# Patient Record
Sex: FEMALE | Race: WHITE | NOT HISPANIC OR LATINO | Employment: OTHER | URBAN - METROPOLITAN AREA
[De-identification: names, ages, dates, MRNs, and addresses within clinical notes are randomized per-mention and may not be internally consistent; named-entity substitution may affect disease eponyms.]

---

## 2017-02-17 ENCOUNTER — AMBULATORY - HEALTHEAST (OUTPATIENT)
Dept: ADMINISTRATIVE | Facility: REHABILITATION | Age: 57
End: 2017-02-17

## 2017-02-17 DIAGNOSIS — I69.320 APHASIA DUE TO RECENT CEREBRAL INFARCTION: ICD-10-CM

## 2017-02-27 ENCOUNTER — OFFICE VISIT - HEALTHEAST (OUTPATIENT)
Dept: SPEECH THERAPY | Facility: REHABILITATION | Age: 57
End: 2017-02-27

## 2017-02-27 DIAGNOSIS — I69.390 APRAXIA, POST-STROKE: ICD-10-CM

## 2017-02-27 DIAGNOSIS — I69.320 APHASIA DUE TO RECENT CEREBRAL INFARCTION: ICD-10-CM

## 2017-02-27 DIAGNOSIS — I69.320 APHASIA, POST-STROKE: ICD-10-CM

## 2017-03-06 ENCOUNTER — OFFICE VISIT - HEALTHEAST (OUTPATIENT)
Dept: SPEECH THERAPY | Facility: REHABILITATION | Age: 57
End: 2017-03-06

## 2017-03-06 DIAGNOSIS — I69.320 APHASIA, POST-STROKE: ICD-10-CM

## 2017-03-06 DIAGNOSIS — I69.390 APRAXIA, POST-STROKE: ICD-10-CM

## 2017-03-13 ENCOUNTER — OFFICE VISIT - HEALTHEAST (OUTPATIENT)
Dept: SPEECH THERAPY | Facility: REHABILITATION | Age: 57
End: 2017-03-13

## 2017-03-13 DIAGNOSIS — I69.320 APHASIA, POST-STROKE: ICD-10-CM

## 2017-03-13 DIAGNOSIS — I69.390 APRAXIA, POST-STROKE: ICD-10-CM

## 2017-03-22 ENCOUNTER — OFFICE VISIT - HEALTHEAST (OUTPATIENT)
Dept: SPEECH THERAPY | Facility: REHABILITATION | Age: 57
End: 2017-03-22

## 2017-03-22 DIAGNOSIS — I69.320 APHASIA, POST-STROKE: ICD-10-CM

## 2017-03-22 DIAGNOSIS — I69.390 APRAXIA, POST-STROKE: ICD-10-CM

## 2020-06-19 ENCOUNTER — RECORDS - HEALTHEAST (OUTPATIENT)
Dept: ADMINISTRATIVE | Facility: OTHER | Age: 60
End: 2020-06-19

## 2020-07-08 ENCOUNTER — HOSPITAL ENCOUNTER (OUTPATIENT)
Dept: MAMMOGRAPHY | Facility: CLINIC | Age: 60
Discharge: HOME | End: 2020-07-08
Attending: FAMILY MEDICINE

## 2020-07-08 DIAGNOSIS — Z12.31 VISIT FOR SCREENING MAMMOGRAM: ICD-10-CM

## 2020-08-07 ENCOUNTER — RECORDS - HEALTHEAST (OUTPATIENT)
Dept: LAB | Facility: CLINIC | Age: 60
End: 2020-08-07

## 2020-08-07 LAB
CHOLEST SERPL-MCNC: 282 MG/DL
FASTING STATUS PATIENT QL REPORTED: ABNORMAL
FASTING STATUS PATIENT QL REPORTED: NORMAL
GLUCOSE BLD-MCNC: 109 MG/DL (ref 70–125)
HDLC SERPL-MCNC: 42 MG/DL
LDLC SERPL CALC-MCNC: 196 MG/DL
TRIGL SERPL-MCNC: 218 MG/DL

## 2020-08-10 LAB — HCV AB SERPL QL IA: NEGATIVE

## 2020-09-21 ENCOUNTER — TELEPHONE (OUTPATIENT)
Dept: NEUROLOGY | Facility: CLINIC | Age: 60
End: 2020-09-21

## 2020-09-21 NOTE — TELEPHONE ENCOUNTER
Bill from billing left msg stating pt. Coverage start date is 5/1 so the PA from 1/10 is not valid. A new PA will need to be obtained for Botox.

## 2020-09-24 NOTE — TELEPHONE ENCOUNTER
I spoke with Bill about this today and he is going to appeal the decision. Patients ID # and group # did not change so we were not aware that plan changed.   Katiuska Almanza CMA on 9/24/2020 at 8:56 AM

## 2021-01-15 ENCOUNTER — HEALTH MAINTENANCE LETTER (OUTPATIENT)
Age: 61
End: 2021-01-15

## 2021-02-05 ENCOUNTER — RECORDS - HEALTHEAST (OUTPATIENT)
Dept: LAB | Facility: CLINIC | Age: 61
End: 2021-02-05

## 2021-02-05 LAB
CHOLEST SERPL-MCNC: 186 MG/DL
FASTING STATUS PATIENT QL REPORTED: NORMAL
HDLC SERPL-MCNC: 51 MG/DL
LDLC SERPL CALC-MCNC: 113 MG/DL
TRIGL SERPL-MCNC: 111 MG/DL

## 2021-05-24 ENCOUNTER — RECORDS - HEALTHEAST (OUTPATIENT)
Dept: ADMINISTRATIVE | Facility: CLINIC | Age: 61
End: 2021-05-24

## 2021-05-29 ENCOUNTER — RECORDS - HEALTHEAST (OUTPATIENT)
Dept: ADMINISTRATIVE | Facility: CLINIC | Age: 61
End: 2021-05-29

## 2021-05-31 ENCOUNTER — RECORDS - HEALTHEAST (OUTPATIENT)
Dept: ADMINISTRATIVE | Facility: CLINIC | Age: 61
End: 2021-05-31

## 2021-06-09 NOTE — PROGRESS NOTES
[facility]   Evaluation and Initial Plan of Care    Patient Name: Yeni Amos  Date of evaluation: 2/27/2017  Referral Diagnosis:Aphasia due to recent infarction  Referring provider: Jesus Soria MD  Visit Diagnosis:     ICD-10-CM    1. Aphasia, post-stroke I69.920    2. Apraxia, post-stroke I69.390          Assessment:      Patient presents with moderate to severe aphasia, along with apraxia of speech.    Long Term Goals  Pt. will : learn and use compensatory strategies to aid in production of both written and verbal expression at 1-3 word level in 8 weeks.  Short Term Goals  Patient will write phrases with  moderate cuing with 60% accuracy in 6 weeks   Patient will imitate a list of 10 personal words with 70% accuracy.  Patient will produce labial consonant plus vowel combinations in imitation with 80% accuracy in 6 weeks.    Patient goal:  To learn how to write and speak better.  Patient's expectations/goals are realistic to a limited extent due to the amount of speech therapy already received in the past.    Rehab potential: Good based on prior level of function, evaluation results and motivation and cooperation.     Plan / Patient Instructions:      Plan of Care:  Authorization / Certification Start Date: 02/27/17  Communication with: Referral Source  Patient Related Instruction: Nature of Condition;Treatment plan and rationale;Basis of treatment;Expected outcome  Times per Week: 1  Number of Weeks: 8  Number of Visits: 8    Plan:   Speech therapy 1 times a week for 8 weeks.    Education:   Patient, Family participated in education regarding evaluation results, treatment plan/rationale and expected functional outcome  Patient, Family demonstrate understanding of the education provided.       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  female who presents to therapy today with complaints of difficulty communicating either verbally or in writing. Date of onset/duration of symptoms is 8/13/2000.      Pertinent history includes : CVA on 8/13/2000 resulting in aphasia and apraxia of speech.  Patient presents as alert and cooperative during the session.  Patient reports no pain     Objective:      Examination    Oral motor function was not impaired.    Motor speech was not impaired.   Speech intelligibility was approximately 100 % at the single word level    Voice was not impaired.     Trach/Vent: N/A    Auditory Comprehension:    Patient follows 1 step directions with 100 % accuracy and 2 step verbal directions with 33% accuracy.  Patient answers  simple yes/no questions with 80%accuracy, and complex questions with 50% accuracy.  Patient can follow  simple conversation, but needs verbal information repeated several times before comprehension can occur with lengthier , more complex information.     Reading Comprehension:  Patient can comprehend  simple single words with 100 % accuracy.  Patient can comprehend  simple sentences with 100 % accuracy.  Patient can orally read  simple single words with 40 % accuracy.    Verbal Expression:  Patient produces automatic speech with 100 % accuracy for counting 1-20, but she can only recite 6 out of the 7 days of the week. She can only recite A,B,/C,D, and F for the alphabet.  Patient performs sentence completion tasks with 20 % accuracy.  Patient completes responsive naming task with 0 % accuracy verbally, but with 100% when writing the response.  Patient completes confrontation naming task with 40 % accuracy uncued, 10% with a phonemic cue, and 50% when writing the responses.  Patient answers simple single word questions with 0 % accuracy, but with 50% when using writing.  Patient  can form sentences in conversation inconsistently when just talking in spontaneous conversation, but she has difficulty on  structured language tasks.    Written Expression:  Patient can write  name and address with 100 % accuracy.  Patient can write single words with 50%  accuracy.   She can write short words but not lengthy words.  Cognition: Not assessed due to focus on evaluation of speech /language skills and also due to  time constraints.    Interventions Today  Interventions MINUTES   Speech - Language 60   Cognition    Dysphagia    Assistive technology    Voice            Total 60          Roz Geiger MS, CCC-SLP  2/27/2017

## 2021-06-09 NOTE — PROGRESS NOTES
[facility]   Speech Therapy Daily Progress     Patient Name: Yeni Amos  Date: 3/6/2017  Visit #: 2  Referral Diagnosis: Aphasia due to recent infarction  Referring provider: Jesus Soria MD  Visit Diagnosis:     ICD-10-CM    1. Aphasia, post-stroke I69.920    2. Apraxia, post-stroke I69.390          Session 2 of 8    Assessment:   Patient is benefitting from skilled speech therapy and is making steady progress toward functional goals.  Patient is appropriate to continue with skilled speech therapy intervention, as indicated by initial plan of care.     Long Term Goals  Pt. will : learn and use compensatory strategies to aid in production of both written and verbal expression at 1-3 word level in 8 weeks.  Short Term Goals  Patient will write phrases with moderate cuing with 60% accuracy in 6 weeks (progressing)  Patient will imitate a list of 10 personal words with 70% accuracy.  Patient will produce labial consonant plus vowel combinations in imitation with 80% accuracy in 6 weeks.(progressing)            Plan / Patient Education:     Continue with initial plan of care.    Subjective:     Patient presents as alert and cooperative during the session.  Pain Ratin        Objective:     Speech production: Patient was able to imitate CV combinations beginning with /m/ with 83% accuracy, beginning with /p/ with 50% accuracy, and beginning with /b/ with 83% accuracy.  She imitated CVC combinations beginning with /m/ with 60% accuracy, beginning with /p/ with 40% accuracy, and beginning with /b/ with 50% accuracy. Patient needs max verbal and visual cues to imitate many of the words.  Patient orally read CV combinations beginning with /m/ with 66% accuracy, beginning with /p/ with 66% accuracy, and beginning with /b/ with 83% accuracy. She orally read CVC combinations beginning with /m/ with 20% accuracy, beginning with /p/ with 14% accuracy, and beginning with /b/ with 16%  "accuracy.  Writing: The patient wrote \"I am statement with 40% accuracy uncued.  Patient wrote action verb to describe action picture with 40% accuracy and an object to go with the verb with 80% accuracy ( i.e. Throw ball, blow balloon, brush teeth)      Interventions Today   Interventions MINUTES   Speech - Language 58   Cognition    Dysphagia    Assistive technology    Voice            Total 58     Roz Geigre MS, CCC-SLP  3/6/2017    "

## 2021-06-09 NOTE — PROGRESS NOTES
"[facility]   Speech Therapy Daily Progress     Patient Name: Yeni Amos  Date: 3/13/2017  Visit #: 3  Referral Diagnosis: Aphasia due to recent infarction  Referring provider: Jesus Soria MD  Visit Diagnosis:     ICD-10-CM    1. Aphasia, post-stroke I69.920    2. Apraxia, post-stroke I69.390          Session 3 of 8    Assessment:   Patient is benefitting from skilled speech therapy and is making steady progress toward functional goals.  Patient is appropriate to continue with skilled speech therapy intervention, as indicated by initial plan of care.     Long Term Goals  Pt. will : learn and use compensatory strategies to aid in production of both written and verbal expression at 1-3 word level in 8 weeks.  Short Term Goals  Patient will write phrases with moderate cuing with 60% accuracy in 6 weeks (progressing)  Patient will imitate a list of 10 personal words with 70% accuracy.(progressing)  Patient will produce labial consonant plus vowel combinations in imitation with 80% accuracy in 6 weeks.(progressing)    Plan / Patient Education:     Continue with initial plan of care.    Subjective:     Patient presents as alert and cooperative during the session.  Pain Ratin        Objective:     Writing: Patient was able to write a 3 word phrase -\"I am -- statements with 90% accuracy.  She wrote 2- 3 word common phrases with 30% accuracy (ie. Come here, I miss you) uncued.  Speech production: She was able to orally read CV lists as follows: /m/ plus vowel with 83% accuracy; /p/ plus vowel with 66% accuracy; and /b/ plus vowel with 83% accuracy. She imitated [facility] word lists as follows: /m/ words with 70% accuracy; /b/ words with 40% accuracy; and /p/ words with 80% accuracy on 1st try.      Interventions Today   Interventions MINUTES   Speech - Language 55   Cognition    Dysphagia    Assistive technology    Voice            Total 55     Roz Geiger MS, CCC-SLP  3/13/2017    "

## 2021-06-10 NOTE — PROGRESS NOTES
[facility] Discharge Summary    Patient Name: Yeni Amos  Date: 4/25/2017  Referral Diagnosis: Aphasia due to recent infarction  Referring provider: Jesus Soria MD  Visit Diagnosis:     ICD-10-CM    1. Aphasia, post-stroke I69.920    2. Apraxia, post-stroke I69.390          Assessment:   Patient has had limited response to speech therapy due to dealing with migraines, time post onset of stroke, and severity of her deficits. She cancelled her last 3 scheduled speech therapy visits, and then called to discontinue speech therapy. All of her speech therapy goals were not met, even though patient was highly motivated and cooperative in therapy sessions.  Long Term Goals  Pt. will : learn and use compensatory strategies to aid in production of both written and verbal expression at 1-3 word level in 8 weeks.(Not met)  Short Term Goals  Patient will write phrases with moderate cuing with 60% accuracy in 6 weeks (MET)  Patient will imitate a list of 10 personal words with 70% accuracy.(Not addressed)  Patient will produce labial consonant plus vowel combinations in imitation with 80% accuracy in 6 weeks.(inconsistently met)           Patient was able to write 2-3 word phrases with 70% accuracy.  Patient could imitate consonant plus vowel combinations with up to 100% accuracy but not consistently. Percentages of accuracy varied from session to session and even within a session.  Patient has learned overtime how to communicate in a limited fashion with her , family, and friends using various means of expression.    Plan / Patient Education:     The patient did not wish to schedule further therapy at this time and cancelled remaining speech therapy visits. Thus, speech therapy will be discontinued.      Roz Geiger MS, CCC-SLP  4/25/2017

## 2021-07-21 ENCOUNTER — RECORDS - HEALTHEAST (OUTPATIENT)
Dept: ADMINISTRATIVE | Facility: CLINIC | Age: 61
End: 2021-07-21

## 2021-08-06 ENCOUNTER — LAB REQUISITION (OUTPATIENT)
Dept: LAB | Facility: CLINIC | Age: 61
End: 2021-08-06

## 2021-08-06 DIAGNOSIS — E78.2 MIXED HYPERLIPIDEMIA: ICD-10-CM

## 2021-08-06 DIAGNOSIS — R31.9 HEMATURIA, UNSPECIFIED: ICD-10-CM

## 2021-08-06 LAB
CHOLEST SERPL-MCNC: 200 MG/DL
FASTING STATUS PATIENT QL REPORTED: ABNORMAL
HDLC SERPL-MCNC: 49 MG/DL
LDLC SERPL CALC-MCNC: 126 MG/DL
TRIGL SERPL-MCNC: 124 MG/DL

## 2021-08-06 PROCEDURE — 36415 COLL VENOUS BLD VENIPUNCTURE: CPT | Performed by: FAMILY MEDICINE

## 2021-08-06 PROCEDURE — 80061 LIPID PANEL: CPT | Performed by: FAMILY MEDICINE

## 2021-08-06 PROCEDURE — 87086 URINE CULTURE/COLONY COUNT: CPT | Performed by: FAMILY MEDICINE

## 2021-08-09 LAB
BACTERIA UR CULT: ABNORMAL
BACTERIA UR CULT: ABNORMAL

## 2021-08-21 ENCOUNTER — HEALTH MAINTENANCE LETTER (OUTPATIENT)
Age: 61
End: 2021-08-21

## 2021-10-24 ENCOUNTER — HEALTH MAINTENANCE LETTER (OUTPATIENT)
Age: 61
End: 2021-10-24

## 2022-02-24 ENCOUNTER — LAB REQUISITION (OUTPATIENT)
Dept: LAB | Facility: CLINIC | Age: 62
End: 2022-02-24

## 2022-02-24 DIAGNOSIS — E78.2 MIXED HYPERLIPIDEMIA: ICD-10-CM

## 2022-02-24 LAB
CHOLEST SERPL-MCNC: 183 MG/DL
HDLC SERPL-MCNC: 58 MG/DL
LDLC SERPL CALC-MCNC: 105 MG/DL
TRIGL SERPL-MCNC: 102 MG/DL

## 2022-02-24 PROCEDURE — 80061 LIPID PANEL: CPT | Performed by: FAMILY MEDICINE

## 2022-10-15 ENCOUNTER — HEALTH MAINTENANCE LETTER (OUTPATIENT)
Age: 62
End: 2022-10-15

## 2023-07-18 ENCOUNTER — ANCILLARY PROCEDURE (OUTPATIENT)
Dept: MAMMOGRAPHY | Facility: CLINIC | Age: 63
End: 2023-07-18
Attending: FAMILY MEDICINE
Payer: COMMERCIAL

## 2023-07-18 DIAGNOSIS — Z12.31 VISIT FOR SCREENING MAMMOGRAM: ICD-10-CM

## 2023-07-18 PROCEDURE — 77067 SCR MAMMO BI INCL CAD: CPT

## 2023-07-26 ENCOUNTER — LAB REQUISITION (OUTPATIENT)
Dept: LAB | Facility: CLINIC | Age: 63
End: 2023-07-26

## 2023-07-26 DIAGNOSIS — E78.2 MIXED HYPERLIPIDEMIA: ICD-10-CM

## 2023-07-26 LAB
CHOLEST SERPL-MCNC: 183 MG/DL
HDLC SERPL-MCNC: 55 MG/DL
LDLC SERPL CALC-MCNC: 102 MG/DL
NONHDLC SERPL-MCNC: 128 MG/DL
TRIGL SERPL-MCNC: 130 MG/DL

## 2023-07-26 PROCEDURE — 80061 LIPID PANEL: CPT | Performed by: FAMILY MEDICINE

## 2023-08-26 ENCOUNTER — HEALTH MAINTENANCE LETTER (OUTPATIENT)
Age: 63
End: 2023-08-26

## 2024-02-08 ENCOUNTER — LAB REQUISITION (OUTPATIENT)
Dept: LAB | Facility: CLINIC | Age: 64
End: 2024-02-08

## 2024-02-08 DIAGNOSIS — E78.2 MIXED HYPERLIPIDEMIA: ICD-10-CM

## 2024-02-08 LAB
CHOLEST SERPL-MCNC: 177 MG/DL
FASTING STATUS PATIENT QL REPORTED: NORMAL
HDLC SERPL-MCNC: 60 MG/DL
LDLC SERPL CALC-MCNC: 99 MG/DL
NONHDLC SERPL-MCNC: 117 MG/DL
TRIGL SERPL-MCNC: 90 MG/DL

## 2024-02-08 PROCEDURE — 80061 LIPID PANEL: CPT | Performed by: FAMILY MEDICINE

## 2024-08-20 ENCOUNTER — LAB REQUISITION (OUTPATIENT)
Dept: LAB | Facility: CLINIC | Age: 64
End: 2024-08-20

## 2024-08-20 DIAGNOSIS — S41.159A OPEN BITE OF UNSPECIFIED UPPER ARM, INITIAL ENCOUNTER: ICD-10-CM

## 2024-08-20 PROCEDURE — 87181 SC STD AGAR DILUTION PER AGT: CPT | Performed by: FAMILY MEDICINE

## 2024-08-23 LAB
BACTERIA WND CULT: ABNORMAL

## 2024-10-13 ENCOUNTER — HEALTH MAINTENANCE LETTER (OUTPATIENT)
Age: 64
End: 2024-10-13

## 2025-01-28 ENCOUNTER — LAB REQUISITION (OUTPATIENT)
Dept: LAB | Facility: CLINIC | Age: 65
End: 2025-01-28
Payer: COMMERCIAL

## 2025-01-28 DIAGNOSIS — E78.2 MIXED HYPERLIPIDEMIA: ICD-10-CM

## 2025-01-29 LAB
CHOLEST SERPL-MCNC: 226 MG/DL
FASTING STATUS PATIENT QL REPORTED: YES
HDLC SERPL-MCNC: 50 MG/DL
LDLC SERPL CALC-MCNC: 149 MG/DL
NONHDLC SERPL-MCNC: 176 MG/DL
TRIGL SERPL-MCNC: 136 MG/DL

## 2025-03-18 RX ORDER — DULOXETIN HYDROCHLORIDE 30 MG/1
30 CAPSULE, DELAYED RELEASE ORAL DAILY
COMMUNITY

## 2025-03-18 RX ORDER — DULOXETIN HYDROCHLORIDE 60 MG/1
60 CAPSULE, DELAYED RELEASE ORAL DAILY
COMMUNITY

## 2025-03-18 RX ORDER — ASPIRIN 81 MG/1
81 TABLET ORAL DAILY
Status: ON HOLD | COMMUNITY
End: 2025-03-27

## 2025-03-18 RX ORDER — TRAZODONE HYDROCHLORIDE 50 MG/1
25-100 TABLET ORAL
COMMUNITY

## 2025-03-18 RX ORDER — PRAVASTATIN SODIUM 40 MG
40 TABLET ORAL DAILY
COMMUNITY

## 2025-03-18 NOTE — PROGRESS NOTES
Planning to discharge home on POD 1 in the morning with her significant other helping her.       03/18/25 1441   Discharge Planning   Patient/Family Anticipates Transition to home with family   Concerns to be Addressed all concerns addressed in this encounter   Living Arrangements   People in Home significant other   Type of Residence Private Residence   Is your private residence a single family home or apartment? Single family home   Number of Stairs, Within Home, Primary seven   Stair Railings, Within Home, Primary railings safe and in good condition   Once home, are you able to live on one level? Yes   Which level? Lower Level   Bathroom Shower/Tub Walk-in shower   Equipment Currently Used at Home shower chair;raised toilet seat;grab bar, tub/shower  (Has a walker at home)   Support System   Support Systems Spouse/Significant Other  (significant other, Kishor Kumar)   Do you have someone available to stay with you one or two nights once you are home? Yes   Education   Patient attended total joint pre-op class/received pre-op teaching  email/phone call

## 2025-03-21 RX ORDER — CALCIUM CARBONATE 500 MG/1
1 TABLET, CHEWABLE ORAL 2 TIMES DAILY
Status: ON HOLD | COMMUNITY
End: 2025-03-26

## 2025-03-25 NOTE — TREATMENT PLAN
Orthopedic Surgery Pre-Op Plan: Yeni Amos  pre-op review. This is NOT an H&P   Surgeon: Dr. Quintanilla   [facility]: [facility]  Name of Surgery: Left Total Knee Arthroplasty  Date of Surgery: 3/26/25  H&P: Completed on 3/11/25 by Dr. Jesus Soria at [facility].   History of ASA, NSAIDS, vitamin and/or herbal supplements, GLP-1 Agonist or SGLT Inhibitor medication taken within 10 days?: Yes: on ASA 81 mg for history of CVA x2. Patient was given OK by PCP to hold ASA 81 mg temporarily for 7 days before surgery.   History of blood thinners?: No    Plan:   1) Discharge Plan: Home on POD 1 in the morning with her significant other helping her. Please see Discharge Planning section near bottom of this note for further details.     2) History of CVA's x 2 in 1980 and in 2000 with residual aphasia: S/P PFO Closure in 2000: Per PCP-has mild-moderate expressive aphasia and apraxia.  On secondary prevention with ASA 81 mg daily and statin.  Okay per PCP to temporarily hold ASA 81 mg for 7 days before surgery but this should be resumed after surgery once safe from a bleeding standpoint per Dr. Quintanilla's team.     3) PONV: Reports some nausea/vomiting with anesthesia.  I recommend patient discusses this with preop nursing and anesthesia on day of surgery.  Would likely benefit from antiemetics and other measures to prevent or minimize nausea/vomiting.    4) Hyperlipidemia: On pravastatin.    5) History of Migraines: Infrequent and stable per PCP.    6) GERD: On esomeprazole.    7) Depression: On duloxetine.    Patient appears medically optimized for upcoming surgery. I would recommend Hospitalist Consult to assist with medical management. Please call me below with any questions on this patient.       Review of Systems Notable for: History of CVAs x 2 in 1980 and in 2000 with residual aphasia-s/p PFO closure in 2000, PONV, hyperlipidemia, history of migraines, GERD, depression.    Past Medical History:   Past  Medical History:   Diagnosis Date    Anxiety     Aphasia     post CVA    Arthritis     Cerebral artery occlusion with cerebral infarction (H)     1980 and 2000    Depression     Gastroesophageal reflux disease     History of blood transfusion 1981    History of migraine     Hyperlipidemia     Insomnia     PONV (postoperative nausea and vomiting)     Restless legs syndrome (RLS)     Status post patent foramen ovale closure     Tubular adenoma      Past Surgical History:   Procedure Laterality Date    COLONOSCOPY      KNEE SURGERY Left 1990    ACL reconstruction    MENISCECTOMY Left 2002    OOPHORECTOMY Left 1998    REPAIR PATENT FORAMEN OVALE  2000       Current Medications:  Patient's Medications   New Prescriptions    No medications on file   Previous Medications    ASPIRIN 81 MG EC TABLET    Take 81 mg by mouth daily. Stopping 3/19/25 before surgery    CALCIUM CARBONATE (TUMS) 500 MG CHEWABLE TABLET    Take 1 chew tab by mouth 2 times daily.    DULOXETINE (CYMBALTA) 30 MG CAPSULE    Take 30 mg by mouth daily. With 60 mg dose    DULOXETINE (CYMBALTA) 60 MG CAPSULE    Take 60 mg by mouth daily. With 30 mg dose    ESOMEPRAZOLE (NEXIUM) 20 MG DR CAPSULE    Take 20 mg by mouth daily as needed (heartburn). Take 30-60 minutes before eating.    PRAVASTATIN (PRAVACHOL) 40 MG TABLET    Take 40 mg by mouth daily.    TRAZODONE (DESYREL) 50 MG TABLET    Take  mg by mouth nightly as needed for sleep.   Modified Medications    No medications on file   Discontinued Medications    No medications on file       ALLERGIES:  Allergies   Allergen Reactions    Macrobid [Nitrofurantoin] Hives       Social History  Social History     Tobacco Use    Smoking status: Never    Smokeless tobacco: Never   Substance Use Topics    Alcohol use: Yes     Comment: 2-4 drinks per month    Drug use: Not Currently     Comment: years ago       Any Abnormal Recent Diagnostics? No    Discharge Planning:   Planning to discharge home on POD 1 in the  morning with her significant other helping her.        03/18/25 8776   Discharge Planning   Patient/Family Anticipates Transition to home with family   Concerns to be Addressed all concerns addressed in this encounter   Living Arrangements   People in Home significant other   Type of Residence Private Residence   Is your private residence a single family home or apartment? Single family home   Number of Stairs, Within Home, Primary seven   Stair Railings, Within Home, Primary railings safe and in good condition   Once home, are you able to live on one level? Yes   Which level? Lower Level   Bathroom Shower/Tub Walk-in shower   Equipment Currently Used at Home shower chair;raised toilet seat;grab bar, tub/shower  (Has a walker at home)   Support System   Support Systems Spouse/Significant Other  (significant other, Kishor Kumar)   Do you have someone available to stay with you one or two nights once you are home? Yes   Education   Patient attended total joint pre-op class/received pre-op teaching  email/phone call       VEENA Araiza CNP   Advanced Practice Nurse Navigator- Orthopedics  [facility]   Phone: [phone]

## 2025-03-26 ENCOUNTER — ANESTHESIA (OUTPATIENT)
Dept: SURGERY | Facility: CLINIC | Age: 65
End: 2025-03-26
Payer: COMMERCIAL

## 2025-03-26 ENCOUNTER — ANCILLARY PROCEDURE (OUTPATIENT)
Dept: ULTRASOUND IMAGING | Facility: CLINIC | Age: 65
End: 2025-03-26
Attending: ANESTHESIOLOGY
Payer: COMMERCIAL

## 2025-03-26 ENCOUNTER — APPOINTMENT (OUTPATIENT)
Dept: RADIOLOGY | Facility: CLINIC | Age: 65
End: 2025-03-26
Attending: ORTHOPAEDIC SURGERY
Payer: COMMERCIAL

## 2025-03-26 ENCOUNTER — ANESTHESIA EVENT (OUTPATIENT)
Dept: SURGERY | Facility: CLINIC | Age: 65
End: 2025-03-26
Payer: COMMERCIAL

## 2025-03-26 ENCOUNTER — HOSPITAL ENCOUNTER (OUTPATIENT)
Facility: CLINIC | Age: 65
Discharge: HOME | End: 2025-03-27
Attending: ORTHOPAEDIC SURGERY | Admitting: ORTHOPAEDIC SURGERY
Payer: COMMERCIAL

## 2025-03-26 DIAGNOSIS — Z96.652 HISTORY OF TOTAL KNEE ARTHROPLASTY, LEFT: Primary | ICD-10-CM

## 2025-03-26 PROCEDURE — C1776 JOINT DEVICE (IMPLANTABLE): HCPCS | Performed by: ORTHOPAEDIC SURGERY

## 2025-03-26 PROCEDURE — 258N000003 HC RX IP 258 OP 636: Performed by: NURSE ANESTHETIST, CERTIFIED REGISTERED

## 2025-03-26 PROCEDURE — 999N000065 XR KNEE PORT LEFT 1/2 VIEWS: Mod: LT

## 2025-03-26 PROCEDURE — 272N000001 HC OR GENERAL SUPPLY STERILE: Performed by: ORTHOPAEDIC SURGERY

## 2025-03-26 PROCEDURE — 250N000009 HC RX 250: Performed by: PHYSICIAN ASSISTANT

## 2025-03-26 PROCEDURE — 710N000009 HC RECOVERY PHASE 1, LEVEL 1, PER MIN: Performed by: ORTHOPAEDIC SURGERY

## 2025-03-26 PROCEDURE — 999N000141 HC STATISTIC PRE-PROCEDURE NURSING ASSESSMENT: Performed by: ORTHOPAEDIC SURGERY

## 2025-03-26 PROCEDURE — 258N000003 HC RX IP 258 OP 636: Performed by: ANESTHESIOLOGY

## 2025-03-26 PROCEDURE — 258N000001 HC RX 258: Performed by: ORTHOPAEDIC SURGERY

## 2025-03-26 PROCEDURE — 250N000013 HC RX MED GY IP 250 OP 250 PS 637: Performed by: PHYSICIAN ASSISTANT

## 2025-03-26 PROCEDURE — 250N000009 HC RX 250: Performed by: ORTHOPAEDIC SURGERY

## 2025-03-26 PROCEDURE — 250N000009 HC RX 250: Performed by: NURSE ANESTHETIST, CERTIFIED REGISTERED

## 2025-03-26 PROCEDURE — 250N000011 HC RX IP 250 OP 636: Performed by: ANESTHESIOLOGY

## 2025-03-26 PROCEDURE — 250N000011 HC RX IP 250 OP 636: Performed by: PHYSICIAN ASSISTANT

## 2025-03-26 PROCEDURE — 360N000077 HC SURGERY LEVEL 4, PER MIN: Performed by: ORTHOPAEDIC SURGERY

## 2025-03-26 PROCEDURE — 250N000011 HC RX IP 250 OP 636: Performed by: NURSE ANESTHETIST, CERTIFIED REGISTERED

## 2025-03-26 PROCEDURE — 250N000011 HC RX IP 250 OP 636: Mod: JW | Performed by: ANESTHESIOLOGY

## 2025-03-26 PROCEDURE — 370N000017 HC ANESTHESIA TECHNICAL FEE, PER MIN: Performed by: ORTHOPAEDIC SURGERY

## 2025-03-26 PROCEDURE — 250N000011 HC RX IP 250 OP 636: Performed by: ORTHOPAEDIC SURGERY

## 2025-03-26 PROCEDURE — 272N000002 HC OR SUPPLY OTHER OPNP: Performed by: ORTHOPAEDIC SURGERY

## 2025-03-26 PROCEDURE — 250N000013 HC RX MED GY IP 250 OP 250 PS 637: Performed by: ORTHOPAEDIC SURGERY

## 2025-03-26 PROCEDURE — 258N000003 HC RX IP 258 OP 636: Performed by: ORTHOPAEDIC SURGERY

## 2025-03-26 DEVICE — IMPLANTABLE DEVICE
Type: IMPLANTABLE DEVICE | Site: KNEE | Status: FUNCTIONAL
Brand: PERSONA® VIVACIT-E®

## 2025-03-26 DEVICE — IMPLANTABLE DEVICE
Type: IMPLANTABLE DEVICE | Site: KNEE | Status: FUNCTIONAL
Brand: PERSONA® THE PERSONALIZED KNEE® OSSEOTI®

## 2025-03-26 DEVICE — IMPLANTABLE DEVICE
Type: IMPLANTABLE DEVICE | Site: KNEE | Status: FUNCTIONAL
Brand: PERSONA® PPS®

## 2025-03-26 RX ORDER — HYDROMORPHONE HCL IN WATER/PF 6 MG/30 ML
0.4 PATIENT CONTROLLED ANALGESIA SYRINGE INTRAVENOUS
Status: DISCONTINUED | OUTPATIENT
Start: 2025-03-26 | End: 2025-03-27 | Stop reason: HOSPADM

## 2025-03-26 RX ORDER — ONDANSETRON 2 MG/ML
4 INJECTION INTRAMUSCULAR; INTRAVENOUS EVERY 30 MIN PRN
Status: DISCONTINUED | OUTPATIENT
Start: 2025-03-26 | End: 2025-03-26 | Stop reason: HOSPADM

## 2025-03-26 RX ORDER — PRAVASTATIN SODIUM 20 MG
40 TABLET ORAL DAILY
Status: DISCONTINUED | OUTPATIENT
Start: 2025-03-27 | End: 2025-03-27 | Stop reason: HOSPADM

## 2025-03-26 RX ORDER — HYDROMORPHONE HCL IN WATER/PF 6 MG/30 ML
0.4 PATIENT CONTROLLED ANALGESIA SYRINGE INTRAVENOUS EVERY 5 MIN PRN
Status: DISCONTINUED | OUTPATIENT
Start: 2025-03-26 | End: 2025-03-26 | Stop reason: HOSPADM

## 2025-03-26 RX ORDER — CELECOXIB 200 MG/1
400 CAPSULE ORAL ONCE
Status: COMPLETED | OUTPATIENT
Start: 2025-03-26 | End: 2025-03-26

## 2025-03-26 RX ORDER — ONDANSETRON 2 MG/ML
INJECTION INTRAMUSCULAR; INTRAVENOUS PRN
Status: DISCONTINUED | OUTPATIENT
Start: 2025-03-26 | End: 2025-03-26

## 2025-03-26 RX ORDER — SODIUM CHLORIDE, SODIUM LACTATE, POTASSIUM CHLORIDE, CALCIUM CHLORIDE 600; 310; 30; 20 MG/100ML; MG/100ML; MG/100ML; MG/100ML
INJECTION, SOLUTION INTRAVENOUS CONTINUOUS
Status: DISCONTINUED | OUTPATIENT
Start: 2025-03-26 | End: 2025-03-26 | Stop reason: HOSPADM

## 2025-03-26 RX ORDER — NALOXONE HYDROCHLORIDE 0.4 MG/ML
0.1 INJECTION, SOLUTION INTRAMUSCULAR; INTRAVENOUS; SUBCUTANEOUS
Status: DISCONTINUED | OUTPATIENT
Start: 2025-03-26 | End: 2025-03-26 | Stop reason: HOSPADM

## 2025-03-26 RX ORDER — HYDROMORPHONE HCL IN WATER/PF 6 MG/30 ML
0.2 PATIENT CONTROLLED ANALGESIA SYRINGE INTRAVENOUS
Status: DISCONTINUED | OUTPATIENT
Start: 2025-03-26 | End: 2025-03-27 | Stop reason: HOSPADM

## 2025-03-26 RX ORDER — FENTANYL CITRATE 50 UG/ML
25 INJECTION, SOLUTION INTRAMUSCULAR; INTRAVENOUS EVERY 5 MIN PRN
Status: DISCONTINUED | OUTPATIENT
Start: 2025-03-26 | End: 2025-03-26 | Stop reason: HOSPADM

## 2025-03-26 RX ORDER — POLYETHYLENE GLYCOL 3350 17 G/17G
17 POWDER, FOR SOLUTION ORAL DAILY
Status: DISCONTINUED | OUTPATIENT
Start: 2025-03-27 | End: 2025-03-27 | Stop reason: HOSPADM

## 2025-03-26 RX ORDER — NALOXONE HYDROCHLORIDE 0.4 MG/ML
0.2 INJECTION, SOLUTION INTRAMUSCULAR; INTRAVENOUS; SUBCUTANEOUS
Status: DISCONTINUED | OUTPATIENT
Start: 2025-03-26 | End: 2025-03-27 | Stop reason: HOSPADM

## 2025-03-26 RX ORDER — OXYCODONE HYDROCHLORIDE 5 MG/1
5 TABLET ORAL EVERY 4 HOURS PRN
Status: DISCONTINUED | OUTPATIENT
Start: 2025-03-26 | End: 2025-03-27 | Stop reason: HOSPADM

## 2025-03-26 RX ORDER — ONDANSETRON 4 MG/1
4 TABLET, ORALLY DISINTEGRATING ORAL EVERY 6 HOURS PRN
Status: DISCONTINUED | OUTPATIENT
Start: 2025-03-26 | End: 2025-03-27 | Stop reason: HOSPADM

## 2025-03-26 RX ORDER — PROPOFOL 10 MG/ML
INJECTION, EMULSION INTRAVENOUS PRN
Status: DISCONTINUED | OUTPATIENT
Start: 2025-03-26 | End: 2025-03-26

## 2025-03-26 RX ORDER — CEFAZOLIN SODIUM/WATER 2 G/20 ML
2 SYRINGE (ML) INTRAVENOUS SEE ADMIN INSTRUCTIONS
Status: DISCONTINUED | OUTPATIENT
Start: 2025-03-26 | End: 2025-03-26 | Stop reason: HOSPADM

## 2025-03-26 RX ORDER — ACETAMINOPHEN 325 MG/1
975 TABLET ORAL ONCE
Status: DISCONTINUED | OUTPATIENT
Start: 2025-03-26 | End: 2025-03-26 | Stop reason: HOSPADM

## 2025-03-26 RX ORDER — NALOXONE HYDROCHLORIDE 0.4 MG/ML
0.4 INJECTION, SOLUTION INTRAMUSCULAR; INTRAVENOUS; SUBCUTANEOUS
Status: DISCONTINUED | OUTPATIENT
Start: 2025-03-26 | End: 2025-03-27 | Stop reason: HOSPADM

## 2025-03-26 RX ORDER — CEFAZOLIN SODIUM 1 G/3ML
1 INJECTION, POWDER, FOR SOLUTION INTRAMUSCULAR; INTRAVENOUS EVERY 8 HOURS
Status: COMPLETED | OUTPATIENT
Start: 2025-03-26 | End: 2025-03-27

## 2025-03-26 RX ORDER — BUPIVACAINE HYDROCHLORIDE 5 MG/ML
INJECTION, SOLUTION EPIDURAL; INTRACAUDAL; PERINEURAL
Status: COMPLETED | OUTPATIENT
Start: 2025-03-26 | End: 2025-03-26

## 2025-03-26 RX ORDER — DEXAMETHASONE SODIUM PHOSPHATE 10 MG/ML
INJECTION, SOLUTION INTRAMUSCULAR; INTRAVENOUS PRN
Status: DISCONTINUED | OUTPATIENT
Start: 2025-03-26 | End: 2025-03-26

## 2025-03-26 RX ORDER — FENTANYL CITRATE 50 UG/ML
50 INJECTION, SOLUTION INTRAMUSCULAR; INTRAVENOUS EVERY 5 MIN PRN
Status: DISCONTINUED | OUTPATIENT
Start: 2025-03-26 | End: 2025-03-26 | Stop reason: HOSPADM

## 2025-03-26 RX ORDER — BUPIVACAINE HYDROCHLORIDE 7.5 MG/ML
INJECTION, SOLUTION INTRASPINAL
Status: COMPLETED | OUTPATIENT
Start: 2025-03-26 | End: 2025-03-26

## 2025-03-26 RX ORDER — ACETAMINOPHEN 325 MG/1
975 TABLET ORAL EVERY 8 HOURS
Status: DISCONTINUED | OUTPATIENT
Start: 2025-03-26 | End: 2025-03-27 | Stop reason: HOSPADM

## 2025-03-26 RX ORDER — FLUMAZENIL 0.1 MG/ML
0.2 INJECTION, SOLUTION INTRAVENOUS
Status: DISCONTINUED | OUTPATIENT
Start: 2025-03-26 | End: 2025-03-26 | Stop reason: HOSPADM

## 2025-03-26 RX ORDER — LIDOCAINE 40 MG/G
CREAM TOPICAL
Status: DISCONTINUED | OUTPATIENT
Start: 2025-03-26 | End: 2025-03-27 | Stop reason: HOSPADM

## 2025-03-26 RX ORDER — DULOXETIN HYDROCHLORIDE 60 MG/1
60 CAPSULE, DELAYED RELEASE ORAL DAILY
Status: DISCONTINUED | OUTPATIENT
Start: 2025-03-27 | End: 2025-03-27 | Stop reason: HOSPADM

## 2025-03-26 RX ORDER — LIDOCAINE 40 MG/G
CREAM TOPICAL
Status: DISCONTINUED | OUTPATIENT
Start: 2025-03-26 | End: 2025-03-26 | Stop reason: HOSPADM

## 2025-03-26 RX ORDER — DULOXETIN HYDROCHLORIDE 30 MG/1
30 CAPSULE, DELAYED RELEASE ORAL DAILY
Status: DISCONTINUED | OUTPATIENT
Start: 2025-03-27 | End: 2025-03-27 | Stop reason: HOSPADM

## 2025-03-26 RX ORDER — ASPIRIN 81 MG/1
81 TABLET ORAL 2 TIMES DAILY
Status: DISCONTINUED | OUTPATIENT
Start: 2025-03-26 | End: 2025-03-27 | Stop reason: HOSPADM

## 2025-03-26 RX ORDER — PROCHLORPERAZINE MALEATE 10 MG
10 TABLET ORAL EVERY 6 HOURS PRN
Status: DISCONTINUED | OUTPATIENT
Start: 2025-03-26 | End: 2025-03-27 | Stop reason: HOSPADM

## 2025-03-26 RX ORDER — ACETAMINOPHEN 325 MG/1
975 TABLET ORAL ONCE
Status: COMPLETED | OUTPATIENT
Start: 2025-03-26 | End: 2025-03-26

## 2025-03-26 RX ORDER — BISACODYL 10 MG
10 SUPPOSITORY, RECTAL RECTAL DAILY PRN
Status: DISCONTINUED | OUTPATIENT
Start: 2025-03-26 | End: 2025-03-27 | Stop reason: HOSPADM

## 2025-03-26 RX ORDER — CEFAZOLIN SODIUM/WATER 2 G/20 ML
2 SYRINGE (ML) INTRAVENOUS
Status: COMPLETED | OUTPATIENT
Start: 2025-03-26 | End: 2025-03-26

## 2025-03-26 RX ORDER — CELECOXIB 100 MG/1
100 CAPSULE ORAL 2 TIMES DAILY
Status: DISCONTINUED | OUTPATIENT
Start: 2025-03-27 | End: 2025-03-27 | Stop reason: HOSPADM

## 2025-03-26 RX ORDER — ONDANSETRON 2 MG/ML
4 INJECTION INTRAMUSCULAR; INTRAVENOUS EVERY 6 HOURS PRN
Status: DISCONTINUED | OUTPATIENT
Start: 2025-03-26 | End: 2025-03-27 | Stop reason: HOSPADM

## 2025-03-26 RX ORDER — OXYCODONE HYDROCHLORIDE 5 MG/1
10 TABLET ORAL EVERY 4 HOURS PRN
Status: DISCONTINUED | OUTPATIENT
Start: 2025-03-26 | End: 2025-03-27 | Stop reason: HOSPADM

## 2025-03-26 RX ORDER — ONDANSETRON 4 MG/1
4 TABLET, ORALLY DISINTEGRATING ORAL EVERY 30 MIN PRN
Status: DISCONTINUED | OUTPATIENT
Start: 2025-03-26 | End: 2025-03-26 | Stop reason: HOSPADM

## 2025-03-26 RX ORDER — SODIUM CHLORIDE, SODIUM LACTATE, POTASSIUM CHLORIDE, CALCIUM CHLORIDE 600; 310; 30; 20 MG/100ML; MG/100ML; MG/100ML; MG/100ML
INJECTION, SOLUTION INTRAVENOUS CONTINUOUS
Status: DISCONTINUED | OUTPATIENT
Start: 2025-03-26 | End: 2025-03-27 | Stop reason: HOSPADM

## 2025-03-26 RX ORDER — FENTANYL CITRATE 50 UG/ML
25-100 INJECTION, SOLUTION INTRAMUSCULAR; INTRAVENOUS
Status: DISCONTINUED | OUTPATIENT
Start: 2025-03-26 | End: 2025-03-26 | Stop reason: HOSPADM

## 2025-03-26 RX ORDER — DEXAMETHASONE SODIUM PHOSPHATE 4 MG/ML
4 INJECTION, SOLUTION INTRA-ARTICULAR; INTRALESIONAL; INTRAMUSCULAR; INTRAVENOUS; SOFT TISSUE
Status: DISCONTINUED | OUTPATIENT
Start: 2025-03-26 | End: 2025-03-26 | Stop reason: HOSPADM

## 2025-03-26 RX ORDER — HYDROMORPHONE HCL IN WATER/PF 6 MG/30 ML
0.2 PATIENT CONTROLLED ANALGESIA SYRINGE INTRAVENOUS EVERY 5 MIN PRN
Status: DISCONTINUED | OUTPATIENT
Start: 2025-03-26 | End: 2025-03-26 | Stop reason: HOSPADM

## 2025-03-26 RX ORDER — TRANEXAMIC ACID 650 MG/1
1950 TABLET ORAL ONCE
Status: COMPLETED | OUTPATIENT
Start: 2025-03-26 | End: 2025-03-26

## 2025-03-26 RX ORDER — AMOXICILLIN 250 MG
1 CAPSULE ORAL 2 TIMES DAILY
Status: DISCONTINUED | OUTPATIENT
Start: 2025-03-26 | End: 2025-03-27 | Stop reason: HOSPADM

## 2025-03-26 RX ORDER — PROPOFOL 10 MG/ML
INJECTION, EMULSION INTRAVENOUS CONTINUOUS PRN
Status: DISCONTINUED | OUTPATIENT
Start: 2025-03-26 | End: 2025-03-26

## 2025-03-26 RX ADMIN — SENNOSIDES AND DOCUSATE SODIUM 1 TABLET: 50; 8.6 TABLET ORAL at 20:12

## 2025-03-26 RX ADMIN — DEXAMETHASONE SODIUM PHOSPHATE 4 MG: 10 INJECTION, SOLUTION INTRAMUSCULAR; INTRAVENOUS at 15:20

## 2025-03-26 RX ADMIN — ONDANSETRON 4 MG: 2 INJECTION INTRAMUSCULAR; INTRAVENOUS at 15:20

## 2025-03-26 RX ADMIN — CELECOXIB 400 MG: 200 CAPSULE ORAL at 13:16

## 2025-03-26 RX ADMIN — ACETAMINOPHEN 975 MG: 325 TABLET ORAL at 13:16

## 2025-03-26 RX ADMIN — PROPOFOL 20 MG: 10 INJECTION, EMULSION INTRAVENOUS at 15:17

## 2025-03-26 RX ADMIN — Medication 2 G: at 15:08

## 2025-03-26 RX ADMIN — SODIUM CHLORIDE, SODIUM LACTATE, POTASSIUM CHLORIDE, AND CALCIUM CHLORIDE: .6; .31; .03; .02 INJECTION, SOLUTION INTRAVENOUS at 18:42

## 2025-03-26 RX ADMIN — ASPIRIN 81 MG: 81 TABLET, COATED ORAL at 20:12

## 2025-03-26 RX ADMIN — BUPIVACAINE HYDROCHLORIDE 15 ML: 5 INJECTION, SOLUTION EPIDURAL; INTRACAUDAL; PERINEURAL at 14:45

## 2025-03-26 RX ADMIN — PHENYLEPHRINE HYDROCHLORIDE 0.2 MCG/KG/MIN: 10 INJECTION INTRAVENOUS at 15:27

## 2025-03-26 RX ADMIN — FENTANYL CITRATE 50 MCG: 50 INJECTION INTRAMUSCULAR; INTRAVENOUS at 14:46

## 2025-03-26 RX ADMIN — ACETAMINOPHEN 975 MG: 325 TABLET ORAL at 20:12

## 2025-03-26 RX ADMIN — OXYCODONE 5 MG: 5 TABLET ORAL at 22:06

## 2025-03-26 RX ADMIN — BUPIVACAINE HYDROCHLORIDE IN DEXTROSE 1.8 ML: 7.5 INJECTION, SOLUTION SUBARACHNOID at 15:17

## 2025-03-26 RX ADMIN — PROPOFOL 20 MG: 10 INJECTION, EMULSION INTRAVENOUS at 15:14

## 2025-03-26 RX ADMIN — TRANEXAMIC ACID 1950 MG: 650 TABLET ORAL at 13:16

## 2025-03-26 RX ADMIN — CEFAZOLIN 1 G: 1 INJECTION, POWDER, FOR SOLUTION INTRAMUSCULAR; INTRAVENOUS at 22:07

## 2025-03-26 RX ADMIN — MIDAZOLAM HYDROCHLORIDE 1 MG: 1 INJECTION, SOLUTION INTRAMUSCULAR; INTRAVENOUS at 14:47

## 2025-03-26 RX ADMIN — PROPOFOL 150 MCG/KG/MIN: 10 INJECTION, EMULSION INTRAVENOUS at 15:17

## 2025-03-26 RX ADMIN — SODIUM CHLORIDE, SODIUM LACTATE, POTASSIUM CHLORIDE, AND CALCIUM CHLORIDE: .6; .31; .03; .02 INJECTION, SOLUTION INTRAVENOUS at 14:49

## 2025-03-26 RX ADMIN — DEXMEDETOMIDINE HYDROCHLORIDE 12 MCG: 100 INJECTION, SOLUTION INTRAVENOUS at 16:15

## 2025-03-26 ASSESSMENT — COLUMBIA-SUICIDE SEVERITY RATING SCALE - C-SSRS
2. HAVE YOU ACTUALLY HAD ANY THOUGHTS OF KILLING YOURSELF SINCE LAST CONTACT?: NO
1. IN THE PAST MONTH, HAVE YOU WISHED YOU WERE DEAD OR WISHED YOU COULD GO TO SLEEP AND NOT WAKE UP?: NO
6. HAVE YOU EVER DONE ANYTHING, STARTED TO DO ANYTHING, OR PREPARED TO DO ANYTHING TO END YOUR LIFE?: NO
2. HAVE YOU ACTUALLY HAD ANY THOUGHTS OF KILLING YOURSELF IN THE PAST MONTH?: NO
6. HAVE YOU EVER DONE ANYTHING, STARTED TO DO ANYTHING, OR PREPARED TO DO ANYTHING TO END YOUR LIFE?: NO

## 2025-03-26 ASSESSMENT — ACTIVITIES OF DAILY LIVING (ADL)
ADLS_ACUITY_SCORE: 23
ADLS_ACUITY_SCORE: 28
ADLS_ACUITY_SCORE: 32
ADLS_ACUITY_SCORE: 23
ADLS_ACUITY_SCORE: 24
ADLS_ACUITY_SCORE: 24
ADLS_ACUITY_SCORE: 23
ADLS_ACUITY_SCORE: 28
ADLS_ACUITY_SCORE: 28
ADLS_ACUITY_SCORE: 33
ADLS_ACUITY_SCORE: 23

## 2025-03-26 NOTE — ANESTHESIA POSTPROCEDURE EVALUATION
Patient: Yeni Amos    Procedure: Procedure(s):  LEFT TOTAL KNEE ARTHROPLASTY       Anesthesia Type:  Spinal    Note:  Disposition: Outpatient   Postop Pain Control: Uneventful            Sign Out: Well controlled pain   PONV: No   Neuro/Psych: Uneventful            Sign Out: Acceptable/Baseline neuro status   Airway/Respiratory: Uneventful            Sign Out: Acceptable/Baseline resp. status   CV/Hemodynamics: Uneventful            Sign Out: Acceptable CV status; No obvious hypovolemia; No obvious fluid overload   Other NRE: NONE   DID A NON-ROUTINE EVENT OCCUR? No           Last vitals:  Vitals Value Taken Time   /59 03/26/25 1740   Temp 36.7  C (98.06  F) 03/26/25 1749   Pulse 75 03/26/25 1749   Resp 14 03/26/25 1749   SpO2 98 % 03/26/25 1749   Vitals shown include unfiled device data.    Electronically Signed By: Leslie Goldberg, MD  March 26, 2025  5:51 PM

## 2025-03-26 NOTE — BRIEF OP NOTE
[facility]    Brief Operative Note    Pre-operative diagnosis: Osteoarthritis of knee [M17.9] left   Post-operative diagnosis Same as pre-operative diagnosis    Procedure: LEFT TOTAL KNEE ARTHROPLASTY, Left - Knee    Surgeon: Surgeons and Role:     * Javad Quintanilla MD - Primary     * Madonna King PA-C - Assisting  Anesthesia: Spinal   Estimated Blood Loss: Less than 50 ml    Drains: None  Specimens:   ID Type Source Tests Collected by Time Destination   A :  Bone Fragments Knee, Left OR DOCUMENTATION ONLY Javad Quintanilla MD 3/26/2025  3:44 PM      Findings:   Grade 4 tricompartmental osteoarthritis .  Complications: None.  Implants:   Implant Name Type Inv. Item Serial No.  Lot No. LRB No. Used Action   ACL Left Tibia Screw      Left 1 Explanted   PSN TIB POR 0 DEG SPK VANESSA D LT -919-01 - [id number] Total Joint Component/Insert PSN TIB POR 0 DEG SPK VANESSA D LT -377-01  [employer] [id number] Left 1 Implanted   CMPNT PTLR 29MM 3 PG CMNTLS PERSONA OSSEOTI -833-29 - [id number] Total Joint Component/Insert CMPNT PTLR 29MM 3 PG CMNTLS PERSONA OSSEOTI -863-29  [employer] [id number] Left 1 Implanted   INSERT TIBIAL CR SZ 4-5 10MM - [id number] Total Joint Component/Insert INSERT TIBIAL CR SZ 4-5 10MM  [employer] [id number] Left 1 Implanted   Persona The Personalized Knee System Cruciate Retaining Left Size 4 PPS Porous Plasma Spray Standard Femur Total Joint Component/Insert   PHI [id number] Left 1 Implanted

## 2025-03-26 NOTE — ANESTHESIA PROCEDURE NOTES
"Intrathecal injection Procedure Note    Pre-Procedure   Staff -        Anesthesiologist:  Jaleel Hahn MD       Performed By: anesthesiologist       Location: OR       Procedure Start/Stop Times: 3/26/2025 3:13 PM and 3/26/2025 3:17 PM       Pre-Anesthestic Checklist: patient identified, IV checked, risks and benefits discussed, informed consent, monitors and equipment checked and pre-op evaluation  Timeout:       Correct Patient: Yes        Correct Procedure: Yes        Correct Site: Yes        Correct Position: Yes   Procedure Documentation  Procedure: intrathecal injection         Patient Position: sitting       Patient Prep/Sterile Barriers: sterile gloves, mask, patient draped       Skin prep: Chloraprep       Insertion Site: L3-4. (midline approach).       Needle Gauge: 24.        Needle Length (Inches): 4        Spinal Needle Type: Pencan       Introducer used       Introducer: 20 G       # of attempts: 1 and  # of redirects:  0  Medication(s) Administered   0.75% Hyperbaric Bupivacaine (Intrathecal) - Intrathecal   1.8 mL - 3/26/2025 3:17:00 PM  Medication Administration Time: 3/26/2025 3:13 PM      FOR [facility] ([facility]) ONLY:   Pain Team Contact information: please page the Pain Team Via [employer]. Search \"Pain\". During daytime hours, please page the attending first. At night please page the resident first.      "

## 2025-03-26 NOTE — PHARMACY-ADMISSION MEDICATION HISTORY
Pharmacist Admission Medication History    Admission medication history is complete. The information provided in this note is only as accurate as the sources available at the time of the update.    Information Source(s): Patient via in-person  Allergies reviewed with patient and updates made in EHR: yes    Medication History Completed By: Preethi Arita [facility] 3/26/2025 1:25 PM    PTA Med List   Medication Sig Last Dose/Taking    aspirin 81 MG EC tablet Take 81 mg by mouth daily. Stopping 3/19/25 before surgery 3/20/2025    DULoxetine (CYMBALTA) 30 MG capsule Take 30 mg by mouth daily. With 60 mg dose 3/26/2025 Morning    DULoxetine (CYMBALTA) 60 MG capsule Take 60 mg by mouth daily. With 30 mg dose 3/26/2025 Morning    pravastatin (PRAVACHOL) 40 MG tablet Take 40 mg by mouth daily. 3/26/2025 Morning    traZODone (DESYREL) 50 MG tablet Take  mg by mouth nightly as needed for sleep. 3/25/2025 Bedtime

## 2025-03-26 NOTE — BRIEF OP NOTE
[facility]    Brief Operative Note    Pre-operative diagnosis: Osteoarthritis of knee [M17.9] left   Post-operative diagnosis Same as pre-operative diagnosis    Procedure: LEFT TOTAL KNEE ARTHROPLASTY, Left - Knee    Surgeon: Surgeons and Role:     * Javad Quintanilla MD - Primary  Anesthesia: Spinal   Estimated Blood Loss: Less than 100 ml    Drains: None  Specimens: * No specimens in log *  Findings:   Grade 4 osteoarthritis .  Complications: None.  Implants: * No implants in log *

## 2025-03-26 NOTE — ANESTHESIA CARE TRANSFER NOTE
Patient: Yeni Amos    Procedure: Procedure(s):  LEFT TOTAL KNEE ARTHROPLASTY       Diagnosis: Osteoarthritis of knee [M17.9]  Diagnosis Additional Information: No value filed.    Anesthesia Type:   Spinal     Note:    Oropharynx: spontaneously breathing and oropharynx clear of all foreign objects  Level of Consciousness: awake  Oxygen Supplementation: face mask  Level of Supplemental Oxygen (L/min / FiO2): 6  Independent Airway: airway patency satisfactory and stable  Dentition: dentition unchanged  Vital Signs Stable: post-procedure vital signs reviewed and stable  Report to RN Given: handoff report given  Patient transferred to: PACU    Handoff Report: Identifed the Patient, Identified the Reponsible Provider, Reviewed the pertinent medical history, Discussed the surgical course, Reviewed Intra-OP anesthesia mangement and issues during anesthesia, Set expectations for post-procedure period and Allowed opportunity for questions and acknowledgement of understanding      Vitals:  Vitals Value Taken Time   BP     Temp 36.4  C (97.52  F) 03/26/25 1634   Pulse 81 03/26/25 1634   Resp 10 03/26/25 1634   SpO2 100 % 03/26/25 1634   Vitals shown include unfiled device data.    Electronically Signed By: VEENA Santana CRNA  March 26, 2025  4:35 PM

## 2025-03-26 NOTE — OP NOTE
Preoperative diagnosis: Left hip end-stage osteoarthrosis     Postoperative diagnosis: Left hip end-stage osteoarthrosis     Surgery performed: Left total hip arthroplasty     Surgeon: Javad Quintanilla M.D.     First assistant: Madonna King was used in this case to assist with retraction, bony cuts, trial implantation, and permanent implantation of a left total hip arthroplasty as well as wound closure and dressing placement.     Anesthesia: Spinal     Complications: None     Findings: Grade 4 osteoarthritis    Estimated blood loss: 100mL     Implants used:     Depuy [place] pressfit femoral stem size 7 high offset, 56 mm GRIPTION cup, 56 mm x 40 mm Altrx acetabular liner, 40 mm x +1.5 ceramic femoral head    Indications:     The patient has severe left hip pain secondary to osteoarthrosis affecting quality of life. The patient has failed or elected to forego nonoperative care including but not limited to injections, physical therapy, and anti-inflammatory use. The patient understood risks benefits and alternatives and wished to proceed as noted above.       Procedure:     The patient was identified in the preoperative holding area and the left hip was marked as the operative site. The patient was brought to the operating suite where spinal anesthesia was provided by the anesthesia staff. Lateral decubitus position was used. All bony prominences were well-padded and an axillary roll was placed. The operative extremity was prepped and draped in the standard sterile fashion. A timeout was performed. The patient received antibiotics prior to incision.     A lateral incision was made for a posterior approach. The IT band was opened up. The bursa was excised. The short external rotators tears were tagged and released. A T type capsulotomy was performed and the capsule was tagged for later repair. The hip was dislocated. A femoral head and neck resection was performed.     The acetabulum was  exposed. The labrum was excised. We progressivley reamed in 45  of abduction and 30  of anteversion to a size  56 reamer. At this point we had excellent position with bleeding subchondral bone. The acetabular component was press-fit into place after copiously irrigating. We had excellent fixation. The polyethylene was secured to the acetabular shell.     The proximal femur was exposed. He progressively reamed and broached to a size 7 high offset. The hip was reduced and trialed. The hip had excellent stability and appropriate leg lengths. The hip was dislocated and the trial implants were removed.     We copiously irrigated. The femoral stem was press-fit into place with excellent position and excellent fixation. The femoral head was secured using the Fleming taper. The hip was relocated. It was determined to have excellent stability and excellent leg length.  We copiously irrigated. The capsule and short external rotators were repaired.      A layered closure was performed over a drain. Sterile dressings were placed.   The patient tolerated the procedure well.     The patient will be admitted to the hospital status post left total hip arthroplasty for postoperative pain control, DVT prophylaxis, physical therapy, and medical management. Estimated length of stay is 1 midnight. The patient is weightbearing as tolerated. The patient will be on ASA 81 mg p.o. twice daily x 6 weeks.     The patient will return to my clinic in 2 weeks for reevaluation and x-rays of the affected hip

## 2025-03-26 NOTE — ANESTHESIA PROCEDURE NOTES
"Saphenous Procedure Note    Pre-Procedure   Staff -        Anesthesiologist:  Presley Bradford MD       Performed By: anesthesiologist       Location: pre-op       Procedure Start/Stop Times: 3/26/2025 2:45 PM and 3/26/2025 2:48 PM       Pre-Anesthestic Checklist: patient identified, IV checked, site marked, risks and benefits discussed, informed consent, monitors and equipment checked, pre-op evaluation, at physician/surgeon's request and post-op pain management  Timeout:       Correct Patient: Yes        Correct Procedure: Yes        Correct Site: Yes        Correct Position: Yes        Correct Laterality: Yes        Site Marked: Yes  Procedure Documentation  Procedure: Saphenous         Laterality: left       Patient Position: supine       Skin prep: Chloraprep       Needle Gauge: 20.        Needle Length (Inches): 4        Ultrasound guided       1. Ultrasound was used to identify targeted nerve, plexus, vascular marker, or fascial plane and place a needle adjacent to it in real-time.       2. Ultrasound was used to visualize the spread of anesthetic in close proximity to the above referenced structure.       3. A permanent image is entered into the patient's record.       4. The visualized anatomic structures appeared normal.       5. There were no apparent abnormal pathologic findings.    Assessment/Narrative         The placement was negative for: blood aspirated, painful injection and site bleeding       Paresthesias: No.       Bolus given via needle..        Secured via.        Insertion/Infusion Method: Single Shot       Complications: none    Medication(s) Administered   Bupivacaine 0.5% PF (Infiltration) - Infiltration   15 mL - 3/26/2025 2:45:00 PM  Medication Administration Time: 3/26/2025 2:45 PM      FOR [facility] ([facility] [place]) ONLY:   Pain Team Contact information: please page the Pain Team Via [employer]. Search \"Pain\". During daytime hours, please page the attending first. At night please page the "  first.

## 2025-03-26 NOTE — OP NOTE
Preoperative diagnosis: Left knee osteoarthrosis.     Postoperative diagnosis: Left knee osteoarthrosis end-stage    Procedure performed: Left total knee arthroplasty    Surgeon: Javad Quintanilla M.D.    First assistant: Madonna King was used in this case to assist in retraction, bony cuts, trial implantation, and permanent implantation of total knee arthroplasty as well as wound closure and dressing placement.    Anesthesia: Spinal    Complications: None    Estimated blood loss: 50 mL    Findings: Grade 4 tricompartmental osteoarthritis     Implants Used:     Balbir press-fit persona size 4 Left CR femur, size D Left the stemmed tibia, 10mm MC polyethylene, 29mm patella.    Indications:    This patient has severe pain secondary to severe left knee osteoarthrosis.  This patient has failed or elected to forego nonoperative care including but not limited to physical therapy, injections, and pain control with medication.  This patient has significant pain affecting quality of life.  After understanding risks, benefits, alternatives, potential outcomes, the nature of the procedure, and rehabilitation the patient undergo left total knee arthroplasty.    Procedure:    The patient was identified in the preoperative holding area and the left knee was marked as the operative site.  The patient was brought to the operating suite where spinal anesthesia was provided by the anesthesia staff.  The left lower extremity was prepped and draped in the standard sterile fashion.  A timeout was performed.  The patient received antibiotics prior to incision.  We exsanguinated and elevated the thigh tourniquet to 250 mmHg.    A midline incision was made for a medial parapatellar approach.  The fat pad and ACL were excised.  An intramedullary guide was used to align the distal femoral cut at 5  valgus.  The 4-in-1 cutting block was sized and stabilized for the remainder of the distal femoral resections.  Next an  extra medullary tibial cutting guide was used to resect the proximal tibia in neutral alignment.  Posterior osteophytes and the menisci were excised.  We undercut the patella and reamed for the patella.  The above-noted implants were trialed.  The knee had excellent stability from 0-90  of knee flexion with range of motion 0 to 130.  The knee was stable varus valgus stress at 0 and 90 .  Equal flexion extension gaps.  Excellent tracking of the patella with the no hands test.    The trial implants were removed.  We reamed and broached for the femur and the tibia.  The bone was copiously irrigated and dried.  W we press-fit our components into place fixation.  The polyethylene was appropriately secured.  The tourniquet was deflated.  We copiously irrigated.  Hemostasis was achieved.  A layered closure was performed. Sterile dressings were placed.    The patient will be admitted to the hospital status post left total knee arthroplasty for postoperative pain control, DVT prophylaxis, physical therapy, and medical management.  The patient is weightbearing as tolerated.  The patient will be on ASA 81 mg p.o. x 6 weeks.  Estimated length of stay is 1 midnights.      Return to clinic in 2 weeks for reevaluation and x-rays.        Javad Quintanilla MD

## 2025-03-27 ENCOUNTER — APPOINTMENT (OUTPATIENT)
Dept: PHYSICAL THERAPY | Facility: CLINIC | Age: 65
End: 2025-03-27
Attending: ORTHOPAEDIC SURGERY
Payer: COMMERCIAL

## 2025-03-27 ENCOUNTER — APPOINTMENT (OUTPATIENT)
Dept: OCCUPATIONAL THERAPY | Facility: CLINIC | Age: 65
End: 2025-03-27
Attending: ORTHOPAEDIC SURGERY
Payer: COMMERCIAL

## 2025-03-27 VITALS
SYSTOLIC BLOOD PRESSURE: 116 MMHG | WEIGHT: 135 LBS | HEIGHT: 63 IN | DIASTOLIC BLOOD PRESSURE: 58 MMHG | BODY MASS INDEX: 23.92 KG/M2 | OXYGEN SATURATION: 97 % | TEMPERATURE: 98.4 F | HEART RATE: 76 BPM | RESPIRATION RATE: 18 BRPM

## 2025-03-27 LAB
FASTING STATUS PATIENT QL REPORTED: NO
GLUCOSE SERPL-MCNC: 183 MG/DL (ref 70–99)
HGB BLD-MCNC: 10.1 G/DL (ref 11.7–15.7)

## 2025-03-27 PROCEDURE — 36415 COLL VENOUS BLD VENIPUNCTURE: CPT | Performed by: ORTHOPAEDIC SURGERY

## 2025-03-27 PROCEDURE — 250N000011 HC RX IP 250 OP 636: Performed by: ORTHOPAEDIC SURGERY

## 2025-03-27 PROCEDURE — 82947 ASSAY GLUCOSE BLOOD QUANT: CPT | Performed by: ORTHOPAEDIC SURGERY

## 2025-03-27 PROCEDURE — 97535 SELF CARE MNGMENT TRAINING: CPT | Mod: GO

## 2025-03-27 PROCEDURE — 97165 OT EVAL LOW COMPLEX 30 MIN: CPT | Mod: GO

## 2025-03-27 PROCEDURE — 85018 HEMOGLOBIN: CPT | Performed by: ORTHOPAEDIC SURGERY

## 2025-03-27 PROCEDURE — 250N000013 HC RX MED GY IP 250 OP 250 PS 637: Performed by: ORTHOPAEDIC SURGERY

## 2025-03-27 PROCEDURE — 97116 GAIT TRAINING THERAPY: CPT | Mod: GP

## 2025-03-27 PROCEDURE — 97110 THERAPEUTIC EXERCISES: CPT | Mod: GP

## 2025-03-27 PROCEDURE — 97161 PT EVAL LOW COMPLEX 20 MIN: CPT | Mod: GP

## 2025-03-27 RX ORDER — CELECOXIB 100 MG/1
100 CAPSULE ORAL 2 TIMES DAILY
Qty: 28 CAPSULE | Refills: 0 | Status: SHIPPED | OUTPATIENT
Start: 2025-03-27 | End: 2025-04-10

## 2025-03-27 RX ORDER — ACETAMINOPHEN 325 MG/1
975 TABLET ORAL EVERY 8 HOURS
Status: SHIPPED
Start: 2025-03-27

## 2025-03-27 RX ORDER — AMOXICILLIN 250 MG
1 CAPSULE ORAL 2 TIMES DAILY PRN
Qty: 42 TABLET | Refills: 0 | Status: SHIPPED | OUTPATIENT
Start: 2025-03-27

## 2025-03-27 RX ORDER — OXYCODONE HYDROCHLORIDE 5 MG/1
5-10 TABLET ORAL EVERY 4 HOURS PRN
Qty: 25 TABLET | Refills: 0 | Status: SHIPPED | OUTPATIENT
Start: 2025-03-27

## 2025-03-27 RX ORDER — ASPIRIN 81 MG/1
81 TABLET ORAL 2 TIMES DAILY
Status: SHIPPED
Start: 2025-03-27 | End: 2025-05-08

## 2025-03-27 RX ADMIN — POLYETHYLENE GLYCOL 3350 17 G: 17 POWDER, FOR SOLUTION ORAL at 08:25

## 2025-03-27 RX ADMIN — CELECOXIB 100 MG: 100 CAPSULE ORAL at 08:25

## 2025-03-27 RX ADMIN — ASPIRIN 81 MG: 81 TABLET, COATED ORAL at 08:25

## 2025-03-27 RX ADMIN — DULOXETINE HYDROCHLORIDE 60 MG: 60 CAPSULE, DELAYED RELEASE ORAL at 08:25

## 2025-03-27 RX ADMIN — CEFAZOLIN 1 G: 1 INJECTION, POWDER, FOR SOLUTION INTRAMUSCULAR; INTRAVENOUS at 06:35

## 2025-03-27 RX ADMIN — OXYCODONE 10 MG: 5 TABLET ORAL at 02:18

## 2025-03-27 RX ADMIN — ACETAMINOPHEN 975 MG: 325 TABLET ORAL at 04:14

## 2025-03-27 RX ADMIN — PRAVASTATIN SODIUM 40 MG: 20 TABLET ORAL at 08:26

## 2025-03-27 RX ADMIN — SENNOSIDES AND DOCUSATE SODIUM 1 TABLET: 50; 8.6 TABLET ORAL at 08:25

## 2025-03-27 RX ADMIN — DULOXETINE 30 MG: 30 CAPSULE, DELAYED RELEASE ORAL at 08:26

## 2025-03-27 ASSESSMENT — ACTIVITIES OF DAILY LIVING (ADL)
ADLS_ACUITY_SCORE: 33
ADLS_ACUITY_SCORE: 32
ADLS_ACUITY_SCORE: 33

## 2025-03-27 NOTE — PLAN OF CARE
Patient vital signs are at baseline: Yes  Patient able to ambulate as they were prior to admission or with assist devices provided by therapies during their stay:  Yes  Patient MUST void prior to discharge:  Yes  Patient able to tolerate oral intake:  Yes  Pain has adequate pain control using Oral analgesics:  Yes  Does patient have an identified :  Yes  Has goal D/C date and time been discussed with patient:  Yes    Patient alert and oriented. VSS. On 2L of o2 via nasal cannula. LR infusing at 75ml/hr. Tolerating Regular diet. A1 with walker and gait belt. Voiding. PRN oxycodone given for pain, along with ice pack. Call light within reach. Alarms on.

## 2025-03-27 NOTE — PROGRESS NOTES
Patient vital signs are at baseline: Yes  Patient able to ambulate as they were prior to admission or with assist devices provided by therapies during their stay:  Yes  Patient MUST void prior to discharge:  Yes  Patient able to tolerate oral intake:  Yes  Pain has adequate pain control using Oral analgesics:  Yes  Does patient have an identified :  Yes  Has goal D/C date and time been discussed with patient:  Yes    Patient is awake and alert and oriented to time, place and person. Calm and cooperative with nursing cares.    B/P: 126/65, T: 97.8, P: 67, R: 15.     RA none to maintain saturations > 92 %.    5/10. PRN's administered: refused.    Mobility: Assist of 1, Gait belt, and Walker.    Diet: Advance Diet as Tolerated: Regular Diet Adult      Saline Lock.      Alarms on. Call light within reach. Education provided on alarms and call light.    Home when medically cleared.    HLM Admission: 8- Walk 250 feet or more  HLM Daily8- Walk 250 feet or more

## 2025-03-27 NOTE — PROGRESS NOTES
03/27/25 0955   Appointment Info   Signing Clinician's Name / Credentials (PT) Markos Curran, PT   Quick Adds   Quick Adds Certification   Living Environment   People in Home significant other   Current Living Arrangements house   Home Accessibility stairs to enter home;stairs within home   Number of Stairs, Main Entrance 1   Stair Railings, Main Entrance none   Number of Stairs, Within Home, Primary seven   Stair Railings, Within Home, Primary railings on both sides of stairs   Transportation Anticipated family or friend will provide   Self-Care   Usual Activity Tolerance good   Current Activity Tolerance good   Equipment Currently Used at Home shower chair;raised toilet seat;grab bar, tub/shower   Fall history within last six months no   Activity/Exercise/Self-Care Comment Independent prior for all I ADL's and ADL's   General Information   Onset of Illness/Injury or Date of Surgery 03/27/25   Referring Physician Dr. Quintanilla   Patient/Family Therapy Goals Statement (PT) none stated   Pertinent History of Current Problem (include personal factors and/or comorbidities that impact the POC) L TKA  History of CVA's x 2 in 1980 and in 2000 with residual aphasia   Existing Precautions/Restrictions no known precautions/restrictions   Weight-Bearing Status - LLE weight-bearing as tolerated   Cognition   Affect/Mental Status (Cognition) WFL;other (see comments)  (slight aphasia but communicates very well)   Range of Motion (ROM)   ROM Comment decreased ROM s/p L TKA   Strength (Manual Muscle Testing)   Strength (Manual Muscle Testing) No deficits observed during functional mobility   Transfers   Transfers sit-stand transfer   Sit-Stand Transfer   Sit-Stand Midway (Transfers) contact guard;verbal cues   Assistive Device (Sit-Stand Transfers) walker, front-wheeled   Gait/Stairs (Locomotion)   Midway Level (Gait) contact guard;verbal cues   Assistive Device (Gait) walker, front-wheeled   Distance in Feet  (Gait) 20   Pattern (Gait) step-to   Deviations/Abnormal Patterns (Gait) gait speed decreased;zac decreased   Balance   Balance no deficits were identified   Clinical Impression   Criteria for Skilled Therapeutic Intervention Yes, treatment indicated   PT Diagnosis (PT) impaired functional mobility   Influenced by the following impairments pain, decreased ROM, impaired balance, decreased strength   Functional limitations due to impairments gait, stairs, transfers   Clinical Presentation (PT Evaluation Complexity) stable   Clinical Presentation Rationale pt presents as medically diagnosed   Clinical Decision Making (Complexity) low complexity   Planned Therapy Interventions (PT) gait training;home exercise program;patient/family education;stair training;transfer training   Risk & Benefits of therapy have been explained care plan/treatment goals reviewed;patient   PT Total Evaluation Time   PT Eval, Low Complexity Minutes (68284) 10   Therapy Certification   Start of care date 03/27/25   Certification date from 03/27/25   Certification date to 03/27/25   Physical Therapy Goals   PT Frequency One time eval and treatment only   PT Predicted Duration/Target Date for Goal Attainment 03/27/25   PT Goals PT Goal 1;Transfers;Gait;Stairs   PT: Transfers Modified independent;Sit to/from stand;Assistive device;Within precautions;Goal Met   PT: Gait Modified independent;Rolling walker;100 feet;Within precautions;Goal Met   PT: Stairs 8 stairs;Minimal assist;Rail on both sides;Goal Met   PT: Goal 1 Independent with written HEP for LE strengthening and ROM, goal met   Interventions   Interventions Quick Adds Therapeutic Procedure;Therapeutic Activity;Gait Training   Therapeutic Procedure/Exercise   Ther. Procedure: strength, endurance, ROM, flexibillity Minutes (42271) 16   Symptoms Noted During/After Treatment none   Treatment Detail/Skilled Intervention TKA protocol therex x10 reps, cueing for technique,   Therapeutic  Activity   Therapeutic Activities: dynamic activities to improve functional performance Minutes (42565) 2   Treatment Detail/Skilled Intervention sit to/from stand, cueing for safe hand placement and LE positioning, Mod I following education   Gait Training   Gait Training Minutes (62024) 8   Symptoms Noted During/After Treatment (Gait Training) none   Treatment Detail/Skilled Intervention vc for reciprocal gait/heel strike, vc for non recip steps up/down 4 steps with bilat rails and 4 steps with SPC and R railing   Distance in Feet 200   [place] Level (Gait Training) independent   Physical Assistance Level (Gait Training) verbal cues   Weight Bearing (Gait Training) weight-bearing as tolerated   Assistive Device (Gait Training) rolling walker   Pattern Analysis (Gait Training) swing-through gait   Gait Analysis Deviations decreased zac;decreased step length   Impairments (Gait Analysis/Training) pain;strength decreased   Stair Railings present at both sides;present on right side   Physical Assist/Nonphysical Assist (Stairs) supervision;verbal cues;1 person assist   Level of [place] (Stairs) stand-by assist   Assistive Device (Stairs) straight cane   PT Discharge Planning   PT Plan dc PT   PT Discharge Recommendation (DC Rec) other (see comments)  (per ortho MD)   PT Rationale for DC Rec Patient moving well overall and has good home setup and support   PT Brief overview of current status Patient independent with gait/transfers   PT Total Distance Amb During Session (feet) 220   PT Equipment Needed at Discharge cane, straight;walker, rolling   Physical Therapy Time and Intention   Timed Code Treatment Minutes 26   Total Session Time (sum of timed and untimed services) 36   [facility]                                                                                   OUTPATIENT PHYSICAL THERAPY    PLAN OF TREATMENT FOR OUTPATIENT REHABILITATION   Patient's Last Name, First  Name, Yeni Encinas  BECCA YOB: 1960   Provider's Name   [facility]   Medical Record No.  [id number]     Onset Date: 03/27/25 Start of Care Date: 03/27/25     Medical Diagnosis:                  PT Diagnosis:  impaired functional mobility Certification Dates:  From: 03/27/25  To: 03/27/25       See note for plan of treatment, functional goals, and certification details.    I CERTIFY THE NEED FOR THESE SERVICES FURNISHED UNDER        THIS PLAN OF TREATMENT AND WHILE UNDER MY CARE (Physician co-signature of this document indicates review and certification of the therapy plan).

## 2025-03-27 NOTE — PROGRESS NOTES
"[facility] Orthopaedics Progress Note      Post-operative Day: 1 Day Post-Op    Procedure(s):  LEFT TOTAL KNEE ARTHROPLASTY      Subjective: Barbara is seen up resting in bedside chair.  Pain to left knee has been tolerable on p.o. analgesics.  Tolerating regular diet with no nausea or vomiting.  Voiding without difficulty and passing gas.  Reports feeling ready to discharge home today.     Pain: moderate  Chest pain, SOB:  No      Objective:  Blood pressure 126/65, pulse 67, temperature 97.8  F (36.6  C), temperature source Oral, resp. rate 15, height 1.6 m (5' 3\"), weight 61.2 kg (135 lb), SpO2 98%.    Patient Vitals for the past 24 hrs:   BP Temp Temp src Pulse Resp SpO2 Height Weight   03/27/25 0525 126/65 97.8  F (36.6  C) Oral 67 15 98 % -- --   03/27/25 0414 (!) 143/77 98.1  F (36.7  C) Oral 67 20 98 % -- --   03/27/25 0120 111/71 97.8  F (36.6  C) Oral 70 14 98 % -- --   03/26/25 2205 112/58 -- -- 71 -- -- -- --   03/26/25 2125 99/54 97.6  F (36.4  C) Oral 77 15 96 % -- --   03/26/25 2025 108/59 98  F (36.7  C) Oral 77 14 98 % -- --   03/26/25 1925 115/57 97.7  F (36.5  C) Oral 78 16 98 % -- --   03/26/25 1855 114/58 98.3  F (36.8  C) Oral 84 16 93 % -- --   03/26/25 1825 116/53 98.2  F (36.8  C) Oral 70 12 97 % -- --   03/26/25 1800 106/55 98.1  F (36.7  C) -- 75 16 98 % -- --   03/26/25 1750 94/62 98.1  F (36.7  C) Core 78 14 99 % -- --   03/26/25 1740 114/59 98.1  F (36.7  C) -- 75 14 98 % -- --   03/26/25 1730 104/59 97.9  F (36.6  C) -- 75 16 99 % -- --   03/26/25 1720 110/59 98.1  F (36.7  C) -- 77 22 98 % -- --   03/26/25 1710 102/59 97.9  F (36.6  C) -- 72 21 99 % -- --   03/26/25 1700 115/59 97.7  F (36.5  C) -- 73 23 100 % -- --   03/26/25 1650 110/58 97.5  F (36.4  C) -- 74 20 100 % -- --   03/26/25 1640 -- -- -- -- -- 100 % -- --   03/26/25 1631 114/57 97.5  F (36.4  C) Core 86 10 100 % -- --   03/26/25 1455 117/60 -- -- 64 18 99 % -- --   03/26/25 1450 113/57 -- -- 64 13 99 % -- --   03/26/25 " "1445 138/63 -- -- 67 16 96 % -- --   03/26/25 1444 138/63 -- -- 67 18 96 % -- --   03/26/25 1314 137/63 99  F (37.2  C) Temporal 69 18 98 % 1.6 m (5' 3\") 61.2 kg (135 lb)       Wt Readings from Last 4 Encounters:   03/26/25 61.2 kg (135 lb)       General: Alert and orientated, NAD  Respiratory: Non-labored breathing on RA  LLE motor function, sensation, and circulation intact   Yes, Dorsiflexion/plantarflexion intact and equal bilaterally. Moves all other extremities with ease and purpose   Wound status: incisions are clean dry and intact. Yes  Calf tenderness: Bilateral  No    Pertinent Labs   Lab Results: personally reviewed.     No results for input(s): \"INR\", \"WBC\", \"HGB\", \"HCT\", \"MCV\", \"PLT\", \"NA\", \"CRP\" in the last 31727 hours.    Invalid input(s): \"K\", \"GLU\", \"SEDRATE\"    Plan:   Anticoagulation protocol: Aspirin 81 mg BID  x 42  days  Pain medications:  scopainmedication: oxycodone, tylenol, and celebrex  Weight bearing status:  WBAT  Disposition:  Home today pending clearance from therapies    Continue cares and rehabilitation     Report completed by:  VEENA Caruso CNP  Date: 3/27/2025  Time: 7:50 AM    "

## 2025-03-27 NOTE — PROGRESS NOTES
Physical Therapy Discharge Summary    Reason for therapy discharge:    All goals and outcomes met, no further needs identified.    Progress towards therapy goal(s). See goals on Care Plan in [facility] electronic health record for goal details.  Goals met    Therapy recommendation(s):    Continue home exercise program.

## 2025-03-27 NOTE — PROGRESS NOTES
03/27/25 0814   Appointment Info   Signing Clinician's Name / Credentials (OT) Pro Singh, OTR/L   Quick Adds   Quick Adds Certification   Living Environment   People in Home significant other   Current Living Arrangements house   Transportation Anticipated family or friend will provide   Living Environment Comments Pt has 4WW, walkin shower w/ shower chair and grab bars, RTS w/ handles, reacher   Self-Care   Usual Activity Tolerance good   Current Activity Tolerance moderate   Equipment Currently Used at Home shower chair;raised toilet seat;grab bar, tub/shower   Fall history within last six months no   Activity/Exercise/Self-Care Comment Pt IND w/ ADLs and IADLs at baseline   General Information   Onset of Illness/Injury or Date of Surgery 03/26/25   Referring Physician Javad Quintanilla MD   Patient/Family Therapy Goal Statement (OT) wants to go home   Additional Occupational Profile Info/Pertinent History of Current Problem L TKA, 2 past CVAs   Existing Precautions/Restrictions no known precautions/restrictions   Limitations/Impairments aphasia  (mild aphasia)   Left Lower Extremity (Weight-bearing Status) weight-bearing as tolerated (WBAT)   Right Lower Extremity (Weight-bearing Status) weight-bearing as tolerated (WBAT)   Cognitive Status Examination   Orientation Status orientation to person, place and time   Affect/Mental Status (Cognitive) WNL   Visual Perception   Visual Impairment/Limitations WFL   Sensory   Sensory Quick Adds sensation intact   Pain Assessment   Patient Currently in Pain Yes, see Vital Sign flowsheet   Posture   Posture not impaired   Range of Motion Comprehensive   General Range of Motion no range of motion deficits identified   Strength Comprehensive (MMT)   General Manual Muscle Testing (MMT) Assessment no strength deficits identified   Bed Mobility   Bed Mobility supine-sit;sit-supine   Comment (Bed Mobility) SBA   Transfers   Transfers bed-chair transfer;sit-stand  transfer;toilet transfer;shower transfer   Transfer Comments SBA-CGA   Activities of Daily Living   BADL Assessment/Intervention lower body dressing;bathing;toileting   Bathing Assessment/Intervention   [place] Level (Bathing) minimum assist (75% patient effort)   Lower Body Dressing Assessment/Training   [place] Level (Lower Body Dressing) minimum assist (75% patient effort)   Toileting   [place] Level (Toileting) supervision   Clinical Impression   Criteria for Skilled Therapeutic Interventions Met (OT) Yes, treatment indicated   OT Diagnosis decreased ADLs   Influenced by the following impairments TKA   OT Problem List-Impairments impacting ADL activity tolerance impaired;pain;mobility   Assessment of Occupational Performance 1-3 Performance Deficits   Identified Performance Deficits LE dressing/bathing, bed mobility, all transfers   Planned Therapy Interventions (OT) ADL retraining;bed mobility training;transfer training   Clinical Decision Making Complexity (OT) problem focused assessment/low complexity   Risk & Benefits of therapy have been explained evaluation/treatment results reviewed;patient   OT Total Evaluation Time   OT Eval, Low Complexity Minutes (03311) 10   Therapy Certification   Medical Diagnosis TKA   Start of Care Date 03/27/25   Certification date from 03/27/25   Certification date to 03/27/25   OT Goals   Therapy Frequency (OT) One time eval and treatment   OT Predicted Duration/Target Date for Goal Attainment 03/27/25   OT Goals Lower Body Dressing;Bed Mobility;Toilet Transfer/Toileting;Transfers   OT: Lower Body Dressing Modified independent;Goal Met   OT: Bed Mobility Modified independent;supine to/from sitting;Goal Met   OT: Transfer Supervision/stand-by assist;with assistive device;Goal Met  (walkin shower)   OT: Toilet Transfer/Toileting Modified independent;toilet transfer;Goal Met   Interventions   Interventions Quick Adds Self-Care/Home Management   Self-Care/Home  Management   Self-Care/Home Mgmt/ADL, Compensatory, Meal Prep Minutes (47612) 17   Symptoms Noted During/After Treatment (Meal Preparation/Planning Training) increased pain   Treatment Detail/Skilled Intervention Pt edu on compensatory strategies for LE dressing - pt completed Mod I. STS w/ SBA and VSc to push up from chair instead of FWW and amb. ~15 ft to BR w/ FWW, pain w/ mobility. Pt edu on safety technique for toilet/walkin shower transfer - completed each Mod I. Pt encouraged to start out using shower chair until she feels her balance is strong enough. Pt instructed on bed mobility techniques - completed sit<>supine Mod I. Pt edu on sleeping position and car transfers - pt verbalized understanding. Pt amb. around room and back to recliner. New ice pack donned on L knee.   OT Discharge Planning   OT Plan DC OT   OT Discharge Recommendation (DC Rec)   (defer to ortho)   OT Rationale for DC Rec Pt having some pain but tolerating therapy well. Pt has good home setup and spouse will be home as needed to assist. Pt has all necessary DME.   OT Brief overview of current status Mod I w/ ADLs   OT Total Distance Amb During Session (feet) 40   Total Session Time   Timed Code Treatment Minutes 17   Total Session Time (sum of timed and untimed services) 27     [facility]                                                                                   OUTPATIENT OCCUPATIONAL THERAPY    PLAN OF TREATMENT FOR OUTPATIENT REHABILITATION   Patient's Last Name, First Name, Yeni Encinas YOB: 1960   Provider's Name   [facility]   Medical Record No.  [id number]     Onset Date: 03/26/25 Start of Care Date: 03/27/25     Medical Diagnosis:  TKA               OT Diagnosis:  decreased ADLs Certification Dates:  From: 03/27/25  To: 03/27/25     See note for plan of treatment, functional goals, and certification details.    I CERTIFY THE NEED FOR  THESE SERVICES FURNISHED UNDER        THIS PLAN OF TREATMENT AND WHILE UNDER MY CARE (Physician co-signature of this document indicates review and certification of the therapy plan).

## 2025-03-27 NOTE — PLAN OF CARE
Patient vital signs are at baseline: Yes  Patient able to ambulate as they were prior to admission or with assist devices provided by therapies during their stay:  Yes  Patient MUST void prior to discharge:  Yes  Patient able to tolerate oral intake:  Yes  Pain has adequate pain control using Oral analgesics:  Yes  Does patient have an identified :  Yes  Has goal D/C date and time been discussed with patient:  Yes    VSS. Patient A&Ox4, unable to verbalize certain words but can point to her wristband to show you here . She also cannot say which hospital is at but points to [facility] on her inpatient sheets. Up with assistx1 WGB. Voids spontaneously without difficulty. IV SL. Call light within reach.     Problem: Adult Inpatient Plan of Care  Goal: Absence of Hospital-Acquired Illness or Injury  Intervention: Prevent and Manage VTE (Venous Thromboembolism) Risk     Problem: Knee Arthroplasty  Goal: Optimal Pain Control and Function  Intervention: Prevent or Manage Pain

## 2025-03-27 NOTE — DISCHARGE SUMMARY
[facility] Orthopedics Discharge Summary                                  [facility]     MALINA WALDEN [id number]   Age: 64 year old  PCP: Jesus Soria, [phone] 1960     Date of Admission:  3/26/2025  Date of Discharge::  3/27/2025  Discharge Provider:  VEENA Caruso CNP    Code status:  Full Code    Admission Information:  Admission Diagnosis:  Osteoarthritis of knee [M17.9]    Post-Operative Day: 1 Day Post-Op     Reason for admission:  The patient was admitted for the following:Procedure(s) (LRB):  LEFT TOTAL KNEE ARTHROPLASTY (Left)    Active Problems:    History of total knee arthroplasty, left      Allergies:  Macrobid [nitrofurantoin]    Following the procedure noted above the patient was transferred to the post-op floor and started on:    Therapy:  physical therapy and occupational therapy  Anticoagulation Plan: Aspirin 81 mg BID  for 42 days  Pain Management: scopainmedication: oxycodone, tylenol, and celebrex  Weight bearing status: Weight bearing as tolerated     The patient was followed by Orthopedics during the inpatient treatment course:  Complications:  None  Additional consultations:  None     Pertinent Labs   Lab Results: personally reviewed.     Recent Labs   Lab Test 03/27/25  0741   HGB 10.1*          Discharge Information:  Condition at discharge: Stable  Discharge destination:  Discharged to home     Medications at discharge:  Current Discharge Medication List        START taking these medications    Details   acetaminophen (TYLENOL) 325 MG tablet Take 3 tablets (975 mg) by mouth every 8 hours.    Associated Diagnoses: History of total knee arthroplasty, left      celecoxib (CELEBREX) 100 MG capsule Take 1 capsule (100 mg) by mouth 2 times daily for 14 days.  Qty: 28 capsule, Refills: 0    Associated Diagnoses: History of total knee arthroplasty, left      oxyCODONE (ROXICODONE) 5 MG tablet Take 1-2 tablets (5-10 mg) by mouth every 4 hours as needed  for moderate pain.  Qty: 25 tablet, Refills: 0    Associated Diagnoses: History of total knee arthroplasty, left      senna-docusate (SENOKOT-S/PERICOLACE) 8.6-50 MG tablet Take 1 tablet by mouth 2 times daily as needed for constipation.  Qty: 42 tablet, Refills: 0    Associated Diagnoses: History of total knee arthroplasty, left           CONTINUE these medications which have CHANGED    Details   aspirin 81 MG EC tablet Take 1 tablet (81 mg) by mouth 2 times daily.    Associated Diagnoses: History of total knee arthroplasty, left           CONTINUE these medications which have NOT CHANGED    Details   !! DULoxetine (CYMBALTA) 30 MG capsule Take 30 mg by mouth daily. With 60 mg dose      !! DULoxetine (CYMBALTA) 60 MG capsule Take 60 mg by mouth daily. With 30 mg dose      pravastatin (PRAVACHOL) 40 MG tablet Take 40 mg by mouth daily.      traZODone (DESYREL) 50 MG tablet Take  mg by mouth nightly as needed for sleep.       !! - Potential duplicate medications found. Please discuss with provider.                     Follow-Up Care:  Patient should be seen in the office in 14 days by the Orthopedic Surgeon/Physician Assistant.  Call [phone] for appointment or questions.    It was my pleasure to take care of the above patient.  VEENA Caruso CNP

## (undated) DEVICE — DRSG STERI STRIP 1X5" R1548

## (undated) DEVICE — DRSG AQUACEL AG HYDROFIBER  3.5X10" 422605

## (undated) DEVICE — VIAL DECANTER STERILE WHITE DYNJDEC06

## (undated) DEVICE — GLOVE BIOGEL INDICATOR 7.5 LF 41675

## (undated) DEVICE — SOL NACL 0.9% INJ 1000ML BAG 2B1324X

## (undated) DEVICE — SU STRATAFIX MONOCRYL 3-0 SPIRAL PS-2 30CM SXMP1B106

## (undated) DEVICE — SOL NACL 0.9% IRRIG 1000ML BOTTLE 2F7124

## (undated) DEVICE — CUSTOM PACK TOTAL KNEE SOP5BTKHEC

## (undated) DEVICE — GLOVE BIOGEL PI SZ 8.0 40880

## (undated) DEVICE — Device

## (undated) DEVICE — DRILL PIN HEADLESS TROCAR 00-5901-020-00

## (undated) DEVICE — SU STRATAFIX PDS PLUS 1 CT-1 18" SXPP1A404

## (undated) DEVICE — SCREW TEMP PSN ZIM FEMALE 2.5MM 42-5099-025-25

## (undated) DEVICE — GLOVE BIOGEL PI ULTRATOUCH G SZ 7.5 42175

## (undated) DEVICE — HOLDER LIMB VELCRO OR 0814-1533

## (undated) DEVICE — DECANTER FLUID L3 IN TRANSFER STRL LF DYNJDEC03

## (undated) DEVICE — ESU HOLSTER PLASTIC DISP E2400

## (undated) DEVICE — CUSTOM PACK TOTAL KNEE ACCESSORY SOP5BTAHEA

## (undated) DEVICE — HOOD SURG T7PLUS PEEL AWAY FACE SHIELD STRL LF 0416-801-100

## (undated) DEVICE — GLOVE BIOGEL PI INDICATOR 8.0 LF 41680

## (undated) DEVICE — SUTURE MONOCRYL+ 2-0 27IN CT2 MCP333H

## (undated) DEVICE — SOL WATER IRRIG 1000ML BOTTLE 2F7114

## (undated) DEVICE — SUCTION MANIFOLD NEPTUNE 2 SYS 4 PORT 0702-020-000

## (undated) DEVICE — ELECTRODE PATIENT RETURN ADULT L10 FT 2 PLATE CORD 0855C

## (undated) DEVICE — HOOD SURG T7 PLUS STRL LF DISP 0416-801-200

## (undated) DEVICE — BLADE SAW SAGITTAL STRK DUAL CUT 4118-135-090

## (undated) RX ORDER — BUPIVACAINE HYDROCHLORIDE 7.5 MG/ML
INJECTION, SOLUTION INTRASPINAL
Status: DISPENSED
Start: 2025-03-26